# Patient Record
Sex: MALE | Race: ASIAN | NOT HISPANIC OR LATINO
[De-identification: names, ages, dates, MRNs, and addresses within clinical notes are randomized per-mention and may not be internally consistent; named-entity substitution may affect disease eponyms.]

---

## 2023-07-12 ENCOUNTER — APPOINTMENT (OUTPATIENT)
Dept: ORTHOPEDIC SURGERY | Facility: CLINIC | Age: 38
End: 2023-07-12

## 2023-09-11 ENCOUNTER — NON-APPOINTMENT (OUTPATIENT)
Age: 38
End: 2023-09-11

## 2023-09-12 ENCOUNTER — APPOINTMENT (OUTPATIENT)
Dept: ORTHOPEDIC SURGERY | Facility: CLINIC | Age: 38
End: 2023-09-12
Payer: COMMERCIAL

## 2023-09-12 VITALS — HEIGHT: 71 IN | WEIGHT: 170 LBS | BODY MASS INDEX: 23.8 KG/M2

## 2023-09-12 PROCEDURE — 99204 OFFICE O/P NEW MOD 45 MIN: CPT

## 2023-09-12 PROCEDURE — 73564 X-RAY EXAM KNEE 4 OR MORE: CPT | Mod: RT

## 2023-12-05 ENCOUNTER — OUTPATIENT (OUTPATIENT)
Dept: OUTPATIENT SERVICES | Facility: HOSPITAL | Age: 38
LOS: 1 days | Discharge: HOME | End: 2023-12-05

## 2023-12-05 VITALS
OXYGEN SATURATION: 100 % | TEMPERATURE: 98 F | WEIGHT: 179.68 LBS | SYSTOLIC BLOOD PRESSURE: 129 MMHG | DIASTOLIC BLOOD PRESSURE: 82 MMHG | HEIGHT: 70 IN | RESPIRATION RATE: 18 BRPM | HEART RATE: 64 BPM

## 2023-12-05 DIAGNOSIS — S89.91XA UNSPECIFIED INJURY OF RIGHT LOWER LEG, INITIAL ENCOUNTER: ICD-10-CM

## 2023-12-05 DIAGNOSIS — Z01.818 ENCOUNTER FOR OTHER PREPROCEDURAL EXAMINATION: ICD-10-CM

## 2023-12-05 LAB
ANION GAP SERPL CALC-SCNC: 5 MMOL/L — SIGNIFICANT CHANGE UP (ref 5–17)
ANION GAP SERPL CALC-SCNC: 5 MMOL/L — SIGNIFICANT CHANGE UP (ref 5–17)
BUN SERPL-MCNC: 12 MG/DL — SIGNIFICANT CHANGE UP (ref 7–23)
BUN SERPL-MCNC: 12 MG/DL — SIGNIFICANT CHANGE UP (ref 7–23)
CALCIUM SERPL-MCNC: 8.8 MG/DL — SIGNIFICANT CHANGE UP (ref 8.5–10.1)
CALCIUM SERPL-MCNC: 8.8 MG/DL — SIGNIFICANT CHANGE UP (ref 8.5–10.1)
CHLORIDE SERPL-SCNC: 103 MMOL/L — SIGNIFICANT CHANGE UP (ref 96–108)
CHLORIDE SERPL-SCNC: 103 MMOL/L — SIGNIFICANT CHANGE UP (ref 96–108)
CO2 SERPL-SCNC: 30 MMOL/L — SIGNIFICANT CHANGE UP (ref 22–31)
CO2 SERPL-SCNC: 30 MMOL/L — SIGNIFICANT CHANGE UP (ref 22–31)
CREAT SERPL-MCNC: 1.09 MG/DL — SIGNIFICANT CHANGE UP (ref 0.5–1.3)
CREAT SERPL-MCNC: 1.09 MG/DL — SIGNIFICANT CHANGE UP (ref 0.5–1.3)
EGFR: 90 ML/MIN/1.73M2 — SIGNIFICANT CHANGE UP
EGFR: 90 ML/MIN/1.73M2 — SIGNIFICANT CHANGE UP
GLUCOSE SERPL-MCNC: 98 MG/DL — SIGNIFICANT CHANGE UP (ref 70–99)
GLUCOSE SERPL-MCNC: 98 MG/DL — SIGNIFICANT CHANGE UP (ref 70–99)
HCT VFR BLD CALC: 44.2 % — SIGNIFICANT CHANGE UP (ref 39–50)
HCT VFR BLD CALC: 44.2 % — SIGNIFICANT CHANGE UP (ref 39–50)
HGB BLD-MCNC: 14.7 G/DL — SIGNIFICANT CHANGE UP (ref 13–17)
HGB BLD-MCNC: 14.7 G/DL — SIGNIFICANT CHANGE UP (ref 13–17)
MCHC RBC-ENTMCNC: 28.1 PG — SIGNIFICANT CHANGE UP (ref 27–34)
MCHC RBC-ENTMCNC: 28.1 PG — SIGNIFICANT CHANGE UP (ref 27–34)
MCHC RBC-ENTMCNC: 33.3 G/DL — SIGNIFICANT CHANGE UP (ref 32–36)
MCHC RBC-ENTMCNC: 33.3 G/DL — SIGNIFICANT CHANGE UP (ref 32–36)
MCV RBC AUTO: 84.5 FL — SIGNIFICANT CHANGE UP (ref 80–100)
MCV RBC AUTO: 84.5 FL — SIGNIFICANT CHANGE UP (ref 80–100)
NRBC # BLD: 0 /100 WBCS — SIGNIFICANT CHANGE UP (ref 0–0)
NRBC # BLD: 0 /100 WBCS — SIGNIFICANT CHANGE UP (ref 0–0)
PLATELET # BLD AUTO: 271 K/UL — SIGNIFICANT CHANGE UP (ref 150–400)
PLATELET # BLD AUTO: 271 K/UL — SIGNIFICANT CHANGE UP (ref 150–400)
POTASSIUM SERPL-MCNC: 3.8 MMOL/L — SIGNIFICANT CHANGE UP (ref 3.5–5.3)
POTASSIUM SERPL-MCNC: 3.8 MMOL/L — SIGNIFICANT CHANGE UP (ref 3.5–5.3)
POTASSIUM SERPL-SCNC: 3.8 MMOL/L — SIGNIFICANT CHANGE UP (ref 3.5–5.3)
POTASSIUM SERPL-SCNC: 3.8 MMOL/L — SIGNIFICANT CHANGE UP (ref 3.5–5.3)
RBC # BLD: 5.23 M/UL — SIGNIFICANT CHANGE UP (ref 4.2–5.8)
RBC # BLD: 5.23 M/UL — SIGNIFICANT CHANGE UP (ref 4.2–5.8)
RBC # FLD: 12.3 % — SIGNIFICANT CHANGE UP (ref 10.3–14.5)
RBC # FLD: 12.3 % — SIGNIFICANT CHANGE UP (ref 10.3–14.5)
SODIUM SERPL-SCNC: 138 MMOL/L — SIGNIFICANT CHANGE UP (ref 135–145)
SODIUM SERPL-SCNC: 138 MMOL/L — SIGNIFICANT CHANGE UP (ref 135–145)
WBC # BLD: 5.52 K/UL — SIGNIFICANT CHANGE UP (ref 3.8–10.5)
WBC # BLD: 5.52 K/UL — SIGNIFICANT CHANGE UP (ref 3.8–10.5)
WBC # FLD AUTO: 5.52 K/UL — SIGNIFICANT CHANGE UP (ref 3.8–10.5)
WBC # FLD AUTO: 5.52 K/UL — SIGNIFICANT CHANGE UP (ref 3.8–10.5)

## 2023-12-05 NOTE — H&P PST ADULT - HISTORY OF PRESENT ILLNESS
39 y/o M presents to PST for scheduled right knee arthroscopy ACL reconstruction possible medial and lateral meniscus repair on 12/18/23 with   37 y/o M presents to PST for scheduled right knee arthroscopy ACL reconstruction possible medial and lateral meniscus repair on 12/18/23 with   39 y/o M presents to PST after basketball injury and is scheduled for right knee arthroscopy ACL reconstruction possible medial and lateral meniscus repair on 12/18/23 with   37 y/o M presents to PST after basketball injury and is scheduled for right knee arthroscopy ACL reconstruction possible medial and lateral meniscus repair on 12/18/23 with

## 2023-12-05 NOTE — H&P PST ADULT - NSICDXPASTMEDICALHX_GEN_ALL_CORE_FT
Spoke with pt's wife, Bri.   Provided appointment info for 4/21 at 930 am   
PAST MEDICAL HISTORY:  No pertinent past medical history

## 2023-12-05 NOTE — H&P PST ADULT - PROBLEM SELECTOR PLAN 1
Wound care
Labs-CBC, BMP  Preop Hibiclens x 1 day instructions reviewed and given.  Take routine meds DOS with small sips of water, avoid NSAIDs and OTC supplements   Anesthesiologist to review PST labs, EKG, required clearances, and optimization for surgery

## 2023-12-05 NOTE — H&P PST ADULT - ASSESSMENT
39 y/o M presents to PST for scheduled right knee arthroscopy ACL reconstruction possible medial and lateral meniscus repair on 12/18/23 with   39 y/o M presents to PST for scheduled right knee arthroscopy ACL reconstruction possible medial and lateral meniscus repair on 23 with Dr.Cohn CAPRINI SCORE [CLOT]    AGE RELATED RISK FACTORS                                                       MOBILITY RELATED FACTORS  [ ] Age 41-60 years                                            (1 Point)                  [ ] Bed rest                                                        (1 Point)  [ ] Age: 61-74 years                                           (2 Points)                 [ ] Plaster cast                                                   (2 Points)  [ ] Age= 75 years                                              (3 Points)                 [ ] Bed bound for more than 72 hours                 (2 Points)    DISEASE RELATED RISK FACTORS                                               GENDER SPECIFIC FACTORS  [ ] Edema in the lower extremities                       (1 Point)                  [ ] Pregnancy                                                     (1 Point)  [ ] Varicose veins                                               (1 Point)                  [ ] Post-partum < 6 weeks                                   (1 Point)             [ ] BMI > 25 Kg/m2                                            (1 Point)                  [ ] Hormonal therapy  or oral contraception          (1 Point)                 [ ] Sepsis (in the previous month)                        (1 Point)                  [ ] History of pregnancy complications                 (1 point)  [ ] Pneumonia or serious lung disease                                               [ ] Unexplained or recurrent                     (1 Point)           (in the previous month)                               (1 Point)  [ ] Abnormal pulmonary function test                     (1 Point)                 SURGERY RELATED RISK FACTORS  [ ] Acute myocardial infarction                              (1 Point)                 [ ]  Section                                             (1 Point)  [ ] Congestive heart failure (in the previous month)  (1 Point)               [ ] Minor surgery                                                  (1 Point)   [ ] Inflammatory bowel disease                             (1 Point)                 [ x] Arthroscopic surgery                                        (2 Points)  [ ] Central venous access                                      (2 Points)                [ ] General surgery lasting more than 45 minutes   (2 Points)       [ ] Stroke (in the previous month)                          (5 Points)               [ ] Elective arthroplasty                                         (5 Points)                                                                                                                                               HEMATOLOGY RELATED FACTORS                                                 TRAUMA RELATED RISK FACTORS  [ ] Prior episodes of VTE                                     (3 Points)                [ ] Fracture of the hip, pelvis, or leg                       (5 Points)  [ ] Positive family history for VTE                         (3 Points)                 [ ] Acute spinal cord injury (in the previous month)  (5 Points)  [ ] Prothrombin 63598 A                                     (3 Points)                 [ ] Paralysis  (less than 1 month)                             (5 Points)  [ ] Factor V Leiden                                             (3 Points)                  [ ] Multiple Trauma within 1 month                        (5 Points)  [ ] Lupus anticoagulants                                     (3 Points)                                                           [ ] Anticardiolipin antibodies                               (3 Points)                                                       [ ] High homocysteine in the blood                      (3 Points)                                             [ ] Other congenital or acquired thrombophilia      (3 Points)                                                [ ] Heparin induced thrombocytopenia                  (3 Points)                                          Total Score [      2    ]    Ozi Score 0 - 2:  Low Risk, No VTE Prophylaxis required for most patients, encourage ambulation  Caprini Score 3 - 6:  At Risk, pharmacologic VTE prophylaxis is indicated for most patients (in the absence of a contraindication)  Caprini Score Greater than or = 7:  High Risk, pharmacologic VTE prophylaxis is indicated for most patients (in the absence of a contraindication) 39 y/o M presents to PST for scheduled right knee arthroscopy ACL reconstruction possible medial and lateral meniscus repair on 23 with Dr.Cohn CAPRINI SCORE [CLOT]    AGE RELATED RISK FACTORS                                                       MOBILITY RELATED FACTORS  [ ] Age 41-60 years                                            (1 Point)                  [ ] Bed rest                                                        (1 Point)  [ ] Age: 61-74 years                                           (2 Points)                 [ ] Plaster cast                                                   (2 Points)  [ ] Age= 75 years                                              (3 Points)                 [ ] Bed bound for more than 72 hours                 (2 Points)    DISEASE RELATED RISK FACTORS                                               GENDER SPECIFIC FACTORS  [ ] Edema in the lower extremities                       (1 Point)                  [ ] Pregnancy                                                     (1 Point)  [ ] Varicose veins                                               (1 Point)                  [ ] Post-partum < 6 weeks                                   (1 Point)             [ ] BMI > 25 Kg/m2                                            (1 Point)                  [ ] Hormonal therapy  or oral contraception          (1 Point)                 [ ] Sepsis (in the previous month)                        (1 Point)                  [ ] History of pregnancy complications                 (1 point)  [ ] Pneumonia or serious lung disease                                               [ ] Unexplained or recurrent                     (1 Point)           (in the previous month)                               (1 Point)  [ ] Abnormal pulmonary function test                     (1 Point)                 SURGERY RELATED RISK FACTORS  [ ] Acute myocardial infarction                              (1 Point)                 [ ]  Section                                             (1 Point)  [ ] Congestive heart failure (in the previous month)  (1 Point)               [ ] Minor surgery                                                  (1 Point)   [ ] Inflammatory bowel disease                             (1 Point)                 [ x] Arthroscopic surgery                                        (2 Points)  [ ] Central venous access                                      (2 Points)                [ ] General surgery lasting more than 45 minutes   (2 Points)       [ ] Stroke (in the previous month)                          (5 Points)               [ ] Elective arthroplasty                                         (5 Points)                                                                                                                                               HEMATOLOGY RELATED FACTORS                                                 TRAUMA RELATED RISK FACTORS  [ ] Prior episodes of VTE                                     (3 Points)                [ ] Fracture of the hip, pelvis, or leg                       (5 Points)  [ ] Positive family history for VTE                         (3 Points)                 [ ] Acute spinal cord injury (in the previous month)  (5 Points)  [ ] Prothrombin 74951 A                                     (3 Points)                 [ ] Paralysis  (less than 1 month)                             (5 Points)  [ ] Factor V Leiden                                             (3 Points)                  [ ] Multiple Trauma within 1 month                        (5 Points)  [ ] Lupus anticoagulants                                     (3 Points)                                                           [ ] Anticardiolipin antibodies                               (3 Points)                                                       [ ] High homocysteine in the blood                      (3 Points)                                             [ ] Other congenital or acquired thrombophilia      (3 Points)                                                [ ] Heparin induced thrombocytopenia                  (3 Points)                                          Total Score [      2    ]    Ozi Score 0 - 2:  Low Risk, No VTE Prophylaxis required for most patients, encourage ambulation  Caprini Score 3 - 6:  At Risk, pharmacologic VTE prophylaxis is indicated for most patients (in the absence of a contraindication)  Caprini Score Greater than or = 7:  High Risk, pharmacologic VTE prophylaxis is indicated for most patients (in the absence of a contraindication)

## 2023-12-05 NOTE — H&P PST ADULT - NSANTHOSAYNRD_GEN_A_CORE
No. WAGNER screening performed.  STOP BANG Legend: 0-2 = LOW Risk; 3-4 = INTERMEDIATE Risk; 5-8 = HIGH Risk

## 2023-12-17 ENCOUNTER — TRANSCRIPTION ENCOUNTER (OUTPATIENT)
Age: 38
End: 2023-12-17

## 2023-12-18 ENCOUNTER — APPOINTMENT (OUTPATIENT)
Dept: ORTHOPEDIC SURGERY | Facility: HOSPITAL | Age: 38
End: 2023-12-18

## 2023-12-18 ENCOUNTER — OUTPATIENT (OUTPATIENT)
Dept: OUTPATIENT SERVICES | Facility: HOSPITAL | Age: 38
LOS: 1 days | Discharge: HOME | End: 2023-12-18
Payer: COMMERCIAL

## 2023-12-18 ENCOUNTER — TRANSCRIPTION ENCOUNTER (OUTPATIENT)
Age: 38
End: 2023-12-18

## 2023-12-18 VITALS
OXYGEN SATURATION: 98 % | SYSTOLIC BLOOD PRESSURE: 126 MMHG | TEMPERATURE: 98 F | HEIGHT: 70 IN | RESPIRATION RATE: 14 BRPM | HEART RATE: 70 BPM | WEIGHT: 173.94 LBS | DIASTOLIC BLOOD PRESSURE: 74 MMHG

## 2023-12-18 VITALS
TEMPERATURE: 98 F | OXYGEN SATURATION: 98 % | DIASTOLIC BLOOD PRESSURE: 76 MMHG | RESPIRATION RATE: 16 BRPM | SYSTOLIC BLOOD PRESSURE: 131 MMHG | HEART RATE: 82 BPM

## 2023-12-18 PROCEDURE — 29888 ARTHRS AID ACL RPR/AGMNTJ: CPT | Mod: RT

## 2023-12-18 PROCEDURE — 29882 ARTHRS KNE SRG MNISC RPR M/L: CPT | Mod: RT

## 2023-12-18 DEVICE — IMP TIGHTROPE II ABS OPEN: Type: IMPLANTABLE DEVICE | Site: RIGHT | Status: FUNCTIONAL

## 2023-12-18 DEVICE — S&N FASTFIX 360 CURVED: Type: IMPLANTABLE DEVICE | Site: RIGHT | Status: FUNCTIONAL

## 2023-12-18 DEVICE — IMPLANTABLE DEVICE: Type: IMPLANTABLE DEVICE | Site: RIGHT | Status: FUNCTIONAL

## 2023-12-18 DEVICE — IMP TIGHTROPE ABS BUTTON 8X12MM: Type: IMPLANTABLE DEVICE | Site: RIGHT | Status: FUNCTIONAL

## 2023-12-18 DEVICE — IMP TIGHTROPE II BTB W/ DEPLOYING SUTURE: Type: IMPLANTABLE DEVICE | Site: RIGHT | Status: FUNCTIONAL

## 2023-12-18 RX ORDER — OXYCODONE HYDROCHLORIDE 5 MG/1
1 TABLET ORAL
Qty: 30 | Refills: 0
Start: 2023-12-18 | End: 2023-12-22

## 2023-12-18 RX ORDER — SODIUM CHLORIDE 9 MG/ML
1000 INJECTION, SOLUTION INTRAVENOUS
Refills: 0 | Status: DISCONTINUED | OUTPATIENT
Start: 2023-12-18 | End: 2023-12-18

## 2023-12-18 RX ORDER — ONDANSETRON 8 MG/1
1 TABLET, FILM COATED ORAL
Qty: 30 | Refills: 0
Start: 2023-12-18 | End: 2023-12-22

## 2023-12-18 RX ORDER — HYDROMORPHONE HYDROCHLORIDE 2 MG/ML
0.5 INJECTION INTRAMUSCULAR; INTRAVENOUS; SUBCUTANEOUS
Refills: 0 | Status: DISCONTINUED | OUTPATIENT
Start: 2023-12-18 | End: 2023-12-18

## 2023-12-18 RX ORDER — DOCUSATE SODIUM 100 MG
1 CAPSULE ORAL
Qty: 7 | Refills: 0
Start: 2023-12-18 | End: 2023-12-24

## 2023-12-18 RX ORDER — ASPIRIN/CALCIUM CARB/MAGNESIUM 324 MG
1 TABLET ORAL
Qty: 30 | Refills: 0
Start: 2023-12-18 | End: 2024-01-16

## 2023-12-18 RX ORDER — SODIUM CHLORIDE 9 MG/ML
3 INJECTION INTRAMUSCULAR; INTRAVENOUS; SUBCUTANEOUS EVERY 8 HOURS
Refills: 0 | Status: DISCONTINUED | OUTPATIENT
Start: 2023-12-18 | End: 2023-12-18

## 2023-12-18 RX ORDER — HYDROMORPHONE HYDROCHLORIDE 2 MG/ML
1 INJECTION INTRAMUSCULAR; INTRAVENOUS; SUBCUTANEOUS
Refills: 0 | Status: DISCONTINUED | OUTPATIENT
Start: 2023-12-18 | End: 2023-12-18

## 2023-12-18 RX ADMIN — HYDROMORPHONE HYDROCHLORIDE 0.5 MILLIGRAM(S): 2 INJECTION INTRAMUSCULAR; INTRAVENOUS; SUBCUTANEOUS at 10:10

## 2023-12-18 RX ADMIN — SODIUM CHLORIDE 75 MILLILITER(S): 9 INJECTION, SOLUTION INTRAVENOUS at 10:05

## 2023-12-18 NOTE — ASU DISCHARGE PLAN (ADULT/PEDIATRIC) - ASU DC SPECIAL INSTRUCTIONSFT
Post-op Instructions ACL Surgery with meniscal repair    POST-OP MEDICATIONS:     PAIN: You were given a prescription for narcotic pain medication. Take this medication as needed and as directed for breakthrough pain. You should try Extra Strength Tylenol first (500mg every 4 hours; not to exceed 3,000mg in 24 hours)/anti-inflammatories (such as Motrin) regularly to help control pain.      BLOOD CLOT PREVENTION: Anti-coagulation is critical to minimize the risk of a DVT (or blood clot).  We recommend you take Aspirin 325mg once daily for 4 weeks as a precaution unless instructed otherwise.     ICE:  An ice device or ice bag (not directly touching the skin) should be utilized to reduce swelling and pain. Please ice every 3-4 hours for about 15-20 minutes each time until swelling subsides. An ice device is typically not covered by insurance but we do have them available to purchase in our office     BRACE: You have been given a knee brace. This should remain on locked in FULL KNEE/LEG EXTENSION until you are otherwise directed by the doctor at your post-op visit. It may be removed for dressing, and bathing. This brace is essential to protect your new ACL while your leg is weak.     AMBULATION: Since you had your meniscus repaired as well, NO WEIGHT BEARING AT ALL ON YOUR RIGHT (OPERATIVE) LEG. Use crutches for assistance.    WOUND CARE:  Leave your surgical dressing on for the first 3 days. After 3 days, you may remove your dressing and shower. Do not remove steri-strips. Incisions may get wet but do not soak them and dry it off well. We recommend putting a sterile dry dressing/gauze or bandaid back over the incisions.     FOLLOW UP VISIT: If you do not already have a follow-up visit scheduled, then please call the office to schedule one within 14 days.      Please refer to Dr. Sparrow's instruction sheet regarding medications to take after surgery

## 2023-12-18 NOTE — ASU DISCHARGE PLAN (ADULT/PEDIATRIC) - CARE PROVIDER_API CALL
Ferdinand Sparrow  Orthopaedic Surgery  [address], [address]  [place] [address]  Phone: [phone]  Fax: [phone]  Follow Up Time:    Ferdinand Sparrow  Orthopaedic Surgery  [address], [address]  [place] [address]  Phone: [phone]  Fax: [phone]  Follow Up Time:

## 2023-12-18 NOTE — ASU DISCHARGE PLAN (ADULT/PEDIATRIC) - NS MD DC FALL RISK RISK
For information on Fall & Injury Prevention, visit: [email or web address]/news/fall-[email or web address]-health-[email or web address]-mobility OR  https://www.[facility]/news/fall-prevention-tips-[email or web address]-injury OR  https://www.cdc.gov/steadi/patient.html For information on Fall & Injury Prevention, visit: https://www.[facility]/news/[email or web address]-[email or web address]-[email or web address]-mobility OR  [email or web address]/[email or web address]-[email or web address]-[email or web address]-injury OR  https://www.cdc.gov/steadi/patient.html

## 2023-12-18 NOTE — ASU PATIENT PROFILE, ADULT - FALL HARM RISK - HARM RISK INTERVENTIONS
Communicate Risk of Fall with Harm to all staff/Reinforce activity limits and safety measures with patient and family/Tailored Fall Risk Interventions/Visual Cue: Yellow wristband and red socks/Bed in lowest position, wheels locked, appropriate side rails in place/Call bell, personal items and telephone in reach/Instruct patient to call for assistance before getting out of bed or chair/Non-slip footwear when patient is out of bed/[place] to call system/Physically safe environment - no spills, clutter or unnecessary equipment/Purposeful Proactive Rounding/Room/bathroom lighting operational, light cord in reach Communicate Risk of Fall with Harm to all staff/Reinforce activity limits and safety measures with patient and family/Tailored Fall Risk Interventions/Visual Cue: Yellow wristband and red socks/Bed in lowest position, wheels locked, appropriate side rails in place/Call bell, personal items and telephone in reach/Instruct patient to call for assistance before getting out of bed or chair/Non-slip footwear when patient is out of bed/[place] to call system/Physically safe environment - no spills, clutter or unnecessary equipment/Purposeful Proactive Rounding/Room/bathroom lighting operational, light cord in reach

## 2023-12-18 NOTE — BRIEF OPERATIVE NOTE - NSICDXBRIEFPROCEDURE_GEN_ALL_CORE_FT
PROCEDURES:  ACL reconstruction 18-Dec-2023 10:00:50 Right knee, with lateral meniscus repair [place]

## 2023-12-22 DIAGNOSIS — S83.511A SPRAIN OF ANTERIOR CRUCIATE LIGAMENT OF RIGHT KNEE, INITIAL ENCOUNTER: ICD-10-CM
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
## 2023-12-22 DIAGNOSIS — S83.281A OTHER TEAR OF LATERAL MENISCUS, CURRENT INJURY, RIGHT KNEE, INITIAL ENCOUNTER: ICD-10-CM
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
## 2023-12-29 ENCOUNTER — APPOINTMENT (OUTPATIENT)
Dept: ORTHOPEDIC SURGERY | Facility: CLINIC | Age: 38
End: 2023-12-29
Payer: COMMERCIAL

## 2023-12-29 PROBLEM — Z78.9 OTHER SPECIFIED HEALTH STATUS: Chronic | Status: ACTIVE | Noted: 2023-12-05

## 2023-12-29 PROCEDURE — 99024 POSTOP FOLLOW-UP VISIT: CPT

## 2023-12-29 NOTE — HISTORY OF PRESENT ILLNESS
[de-identified] : 39yo M s/p Right knee arthroscopy, anterior cruciate ligament reconstruction with quadriceps allograft, lateral meniscus repair, 12/18/23.  Overall doing well pain is controlled he started physical therapy he is using the brace and crutches [de-identified] : R knee [de-identified] : Right knee exam Incisions are healing well no erythema Moderate effusion Range of motion 0-60 Stable anterior drawer Lachman examination no Medial joint line tenderness calf is soft and nontender Able to flex and extend all toes Sensation intact throughout brisk capillary refill. [de-identified] : 37yo M s/p Right knee arthroscopy, anterior cruciate ligament reconstruction with quadriceps allograft, lateral meniscus repair, 12/18/23. [de-identified] : We reviewed postoperative protocol restrictions.  Brace unlocked 0 to 90 degrees encouraged continue physical therapy continue crutches nonweightbearing.  He will follow-up in 1 month.  All questions were answered continue aspirin DVT prophylaxis

## 2024-01-26 ENCOUNTER — APPOINTMENT (OUTPATIENT)
Dept: ORTHOPEDIC SURGERY | Facility: CLINIC | Age: 39
End: 2024-01-26
Payer: COMMERCIAL

## 2024-01-26 PROCEDURE — 99024 POSTOP FOLLOW-UP VISIT: CPT

## 2024-01-26 NOTE — HISTORY OF PRESENT ILLNESS
[de-identified] : R knee [de-identified] : 37yo M s/p Right knee arthroscopy, anterior cruciate ligament reconstruction with quadriceps allograft, lateral meniscus repair, 12/18/23.  Overall doing well pain is controlled he started physical therapy he is using the brace and crutches [de-identified] : Right knee exam Incisions are healing well no erythema no effusion Range of motion 0-120 Stable anterior drawer Lachman examination no Medial joint line tenderness calf is soft and nontender Able to flex and extend all toes Sensation intact throughout brisk capillary refill. [de-identified] : 37yo M s/p Right knee arthroscopy, anterior cruciate ligament reconstruction with quadriceps allograft, lateral meniscus repair, 12/18/23. [de-identified] : We reviewed postoperative protocol restrictions.  He may discontinue his brace at this time continue physical therapy weightbearing as tolerated follow-up 6 weeks

## 2024-01-26 NOTE — HISTORY OF PRESENT ILLNESS
[de-identified] : R knee [de-identified] : 37yo M s/p Right knee arthroscopy, anterior cruciate ligament reconstruction with quadriceps allograft, lateral meniscus repair, 12/18/23.  Overall doing well pain is controlled he started physical therapy he is using the brace and crutches [de-identified] : Right knee exam Incisions are healing well no erythema no effusion Range of motion 0-120 Stable anterior drawer Lachman examination no Medial joint line tenderness calf is soft and nontender Able to flex and extend all toes Sensation intact throughout brisk capillary refill. [de-identified] : 39yo M s/p Right knee arthroscopy, anterior cruciate ligament reconstruction with quadriceps allograft, lateral meniscus repair, 12/18/23. [de-identified] : We reviewed postoperative protocol restrictions.  He may discontinue his brace at this time continue physical therapy weightbearing as tolerated follow-up 6 weeks

## 2024-01-30 ENCOUNTER — NON-APPOINTMENT (OUTPATIENT)
Age: 39
End: 2024-01-30

## 2024-02-15 ENCOUNTER — APPOINTMENT (OUTPATIENT)
Dept: ORTHOPEDIC SURGERY | Facility: CLINIC | Age: 39
End: 2024-02-15
Payer: COMMERCIAL

## 2024-02-15 PROCEDURE — 99024 POSTOP FOLLOW-UP VISIT: CPT

## 2024-03-08 ENCOUNTER — APPOINTMENT (OUTPATIENT)
Dept: ORTHOPEDIC SURGERY | Facility: CLINIC | Age: 39
End: 2024-03-08
Payer: COMMERCIAL

## 2024-03-08 VITALS — BODY MASS INDEX: 24.5 KG/M2 | HEIGHT: 71 IN | WEIGHT: 175 LBS

## 2024-03-08 PROCEDURE — 99024 POSTOP FOLLOW-UP VISIT: CPT

## 2024-03-08 NOTE — HISTORY OF PRESENT ILLNESS
[de-identified] : 39yo M s/p Right knee arthroscopy, anterior cruciate ligament reconstruction with quadriceps allograft, lateral meniscus repair, 12/18/23.  Overall doing well.  She is work with physical therapy reports mild swelling in his knee he is happy with his progress he denies any buckling locking catching instability [de-identified] : R knee [de-identified] : 37yo M s/p Right knee arthroscopy, anterior cruciate ligament reconstruction with quadriceps allograft, lateral meniscus repair, 12/18/23. [de-identified] : Right knee exam Incisions are healing well no erythema Mild effusion Range of motion 0-140 no Medial joint line tenderness.  No lateral joint line tenderness stable anterior drawer Lachman examination calf is soft and nontender Able to flex and extend all toes Sensation intact throughout brisk capillary refill. [de-identified] : We reviewed postoperative protocol and restrictions.  He has excellent range of motion he does have some mild swelling his knee is stable on examination today.  He will continue with physical therapy focusing quadriceps core strengthening he will follow-up in 2 to 3 months.  All questions answered

## 2024-05-08 ENCOUNTER — APPOINTMENT (OUTPATIENT)
Dept: ORTHOPEDIC SURGERY | Facility: CLINIC | Age: 39
End: 2024-05-08
Payer: COMMERCIAL

## 2024-05-08 DIAGNOSIS — S89.91XA UNSPECIFIED INJURY OF RIGHT LOWER LEG, INITIAL ENCOUNTER: ICD-10-CM

## 2024-05-08 PROCEDURE — 99213 OFFICE O/P EST LOW 20 MIN: CPT

## 2024-05-08 NOTE — DISCUSSION/SUMMARY
[de-identified] : He is about 5-month status post anterior crucial ligament reconstruction doing well he has full range of motion no swelling good stability on examination.  Does have significant and continued quadriceps core weakness he continues therapy and exercise  Follow-up in 3 months for sports evaluation.  All questions were answered

## 2024-05-08 NOTE — PHYSICAL EXAM
[de-identified] : right knee exam  Skin: Clean, dry, intact Inspection: No obvious malalignment, no masses, no swelling, no effusion. Tenderness: no MJLT. No LJLT. No tenderness over the medial and lateral patella facets. No ttp medial/lateral epicondyle, patella tendon, tibial tubercle, pes anserinus, or proximal fibula. ROM: 0 to 140 no pain with deep flexion  Stability: Stable to varus, valgus, lachman testing. Negative anterior/posterior drawer. Additional tests: Negative McMurrays test, Negative patellar grind test.  Strength: 5/5 Q/H/TA/GS/EHL, no atrophy Neuro: In tact to light touch throughout in dp/sp/tib/bharath/saph nerve districutions, DTR's normal Pulses: 2+ DP/PT pulses.

## 2024-05-08 NOTE — PHYSICAL EXAM
[de-identified] : right knee exam  Skin: Clean, dry, intact Inspection: No obvious malalignment, no masses, no swelling, no effusion. Tenderness: no MJLT. No LJLT. No tenderness over the medial and lateral patella facets. No ttp medial/lateral epicondyle, patella tendon, tibial tubercle, pes anserinus, or proximal fibula. ROM: 0 to 140 no pain with deep flexion  Stability: Stable to varus, valgus, lachman testing. Negative anterior/posterior drawer. Additional tests: Negative McMurrays test, Negative patellar grind test.  Strength: 5/5 Q/H/TA/GS/EHL, no atrophy Neuro: In tact to light touch throughout in dp/sp/tib/bharath/saph nerve districutions, DTR's normal Pulses: 2+ DP/PT pulses.

## 2024-05-08 NOTE — HISTORY OF PRESENT ILLNESS
[de-identified] : 40yo M s/p Right knee arthroscopy, anterior cruciate ligament reconstruction with quadriceps allograft, lateral meniscus repair, 12/18/23.

## 2024-05-08 NOTE — HISTORY OF PRESENT ILLNESS
[de-identified] : 40yo M s/p Right knee arthroscopy, anterior cruciate ligament reconstruction with quadriceps allograft, lateral meniscus repair, 12/18/23.

## 2024-05-08 NOTE — HISTORY OF PRESENT ILLNESS
[de-identified] : 38yo M s/p Right knee arthroscopy, anterior cruciate ligament reconstruction with quadriceps allograft, lateral meniscus repair, 12/18/23.

## 2024-05-08 NOTE — DISCUSSION/SUMMARY
[de-identified] : He is about 5-month status post anterior crucial ligament reconstruction doing well he has full range of motion no swelling good stability on examination.  Does have significant and continued quadriceps core weakness he continues therapy and exercise  Follow-up in 3 months for sports evaluation.  All questions were answered

## 2024-05-08 NOTE — PHYSICAL EXAM
[de-identified] : right knee exam  Skin: Clean, dry, intact Inspection: No obvious malalignment, no masses, no swelling, no effusion. Tenderness: no MJLT. No LJLT. No tenderness over the medial and lateral patella facets. No ttp medial/lateral epicondyle, patella tendon, tibial tubercle, pes anserinus, or proximal fibula. ROM: 0 to 140 no pain with deep flexion  Stability: Stable to varus, valgus, lachman testing. Negative anterior/posterior drawer. Additional tests: Negative McMurrays test, Negative patellar grind test.  Strength: 5/5 Q/H/TA/GS/EHL, no atrophy Neuro: In tact to light touch throughout in dp/sp/tib/bharath/saph nerve districutions, DTR's normal Pulses: 2+ DP/PT pulses.

## 2024-05-08 NOTE — DISCUSSION/SUMMARY
[de-identified] : He is about 5-month status post anterior crucial ligament reconstruction doing well he has full range of motion no swelling good stability on examination.  Does have significant and continued quadriceps core weakness he continues therapy and exercise  Follow-up in 3 months for sports evaluation.  All questions were answered

## 2024-07-08 ENCOUNTER — APPOINTMENT (OUTPATIENT)
Dept: UROLOGY | Facility: CLINIC | Age: 39
End: 2024-07-08
Payer: COMMERCIAL

## 2024-07-08 VITALS
RESPIRATION RATE: 16 BRPM | WEIGHT: 170 LBS | HEIGHT: 71 IN | SYSTOLIC BLOOD PRESSURE: 135 MMHG | BODY MASS INDEX: 23.8 KG/M2 | DIASTOLIC BLOOD PRESSURE: 81 MMHG | OXYGEN SATURATION: 98 % | HEART RATE: 66 BPM

## 2024-07-08 DIAGNOSIS — S89.91XA UNSPECIFIED INJURY OF RIGHT LOWER LEG, INITIAL ENCOUNTER: ICD-10-CM

## 2024-07-08 DIAGNOSIS — Z80.3 FAMILY HISTORY OF MALIGNANT NEOPLASM OF BREAST: ICD-10-CM

## 2024-07-08 DIAGNOSIS — R35.0 FREQUENCY OF MICTURITION: ICD-10-CM

## 2024-07-08 PROCEDURE — 51798 US URINE CAPACITY MEASURE: CPT

## 2024-07-08 PROCEDURE — G2211 COMPLEX E/M VISIT ADD ON: CPT | Mod: NC

## 2024-07-08 PROCEDURE — 99204 OFFICE O/P NEW MOD 45 MIN: CPT | Mod: 25

## 2024-07-10 LAB
ANION GAP SERPL CALC-SCNC: 13 MMOL/L
APPEARANCE: CLEAR
BACTERIA: NEGATIVE /HPF
BILIRUBIN URINE: NEGATIVE
BLOOD URINE: NEGATIVE
BUN SERPL-MCNC: 12 MG/DL
CALCIUM SERPL-MCNC: 9.7 MG/DL
CAST: 0 /LPF
CHLORIDE SERPL-SCNC: 102 MMOL/L
CO2 SERPL-SCNC: 25 MMOL/L
COLOR: YELLOW
CREAT SERPL-MCNC: 1.18 MG/DL
EGFR: 81 ML/MIN/1.73M2
EPITHELIAL CELLS: 0 /HPF
GLUCOSE QUALITATIVE U: NEGATIVE MG/DL
GLUCOSE SERPL-MCNC: 97 MG/DL
KETONES URINE: NEGATIVE MG/DL
LEUKOCYTE ESTERASE URINE: NEGATIVE
NITRITE URINE: NEGATIVE
PH URINE: 6
POTASSIUM SERPL-SCNC: 4.4 MMOL/L
PROTEIN URINE: NEGATIVE MG/DL
PSA FREE FLD-MCNC: 47 %
PSA FREE SERPL-MCNC: 0.17 NG/ML
PSA SERPL-MCNC: 0.37 NG/ML
SPECIFIC GRAVITY URINE: 1.01
UROBILINOGEN URINE: 0.2 MG/DL
WHITE BLOOD CELLS URINE: 0 /HPF

## 2024-08-09 ENCOUNTER — APPOINTMENT (OUTPATIENT)
Dept: ORTHOPEDIC SURGERY | Facility: CLINIC | Age: 39
End: 2024-08-09

## 2024-08-09 PROCEDURE — 99213 OFFICE O/P EST LOW 20 MIN: CPT

## 2024-08-09 NOTE — PHYSICAL EXAM
[de-identified] : right knee exam  Skin: Clean, dry, intact Inspection: No obvious malalignment, no masses, no swelling, no effusion. Tenderness: no MJLT. No LJLT. No tenderness over the medial and lateral patella facets. No ttp medial/lateral epicondyle, patella tendon, tibial tubercle, pes anserinus, or proximal fibula. ROM: 0 to 140 no pain with deep flexion  Stability: Stable to varus, valgus, lachman testing. Negative anterior/posterior drawer. Additional tests: Negative McMurrays test, Negative patellar grind test.  Strength: 5/5 Q/H/TA/GS/EHL, no atrophy Neuro: In tact to light touch throughout in dp/sp/tib/bharath/saph nerve districutions, DTR's normal Pulses: 2+ DP/PT pulses. Unable to single-leg squat with his pelvis level

## 2024-08-09 NOTE — HISTORY OF PRESENT ILLNESS
[de-identified] : 38yo M s/p Right knee arthroscopy, anterior cruciate ligament reconstruction with quadriceps allograft, lateral meniscus repair, 12/18/23.  He is overall doing well minimal pain he is working physical therapy does feel some weakness in his leg.  He started on the treadmill.  His goal is to return to sprinting and sports  He is doing really well at this time he is working with therapy doing some exercises looking forward to returning to basketball

## 2024-08-09 NOTE — DISCUSSION/SUMMARY
[de-identified] : He is almost 9 months postoperative he is doing very well at this time is a full range of motion excellent stability good strength he can single-leg hop and squat he is cleared to gradually return to sports and activities with slow gradual progression outlined he understands and expressed understanding he will follow-up as needed.  All questions answered

## 2024-12-18 ENCOUNTER — NON-APPOINTMENT (OUTPATIENT)
Age: 39
End: 2024-12-18

## (undated) DEVICE — GLV 8 PROTEXIS (WHITE)

## (undated) DEVICE — VENODYNE/SCD SLEEVE CALF MEDIUM

## (undated) DEVICE — SUT VICRYL 2-0 27" CT-2 UNDYED

## (undated) DEVICE — GLV 7.5 PROTEXIS (WHITE)

## (undated) DEVICE — SUT VICRYL 0 27" CT-2 UNDYED

## (undated) DEVICE — DEPUY ACL GRAFT KNIFE 10MM

## (undated) DEVICE — SHAVER BLADE GREAT WHITE 4.2MM

## (undated) DEVICE — SUT HISTOACRYL BLUE

## (undated) DEVICE — SUT MONOCRYL 3-0 18" PS-2 UNDYED

## (undated) DEVICE — SUT TIGERSTICK TIGERWIRE NUMBER 2

## (undated) DEVICE — SUT FIBERSTICK SIZE 2 50" BLUE

## (undated) DEVICE — SUT FIBERWIRE #2 38" STRAND 1 BLUE T-5 TAPER

## (undated) DEVICE — ELCTR BOVIE PENCIL HANDPIECE

## (undated) DEVICE — DRSG COBAN 4"

## (undated) DEVICE — BLADE SURGICAL #15 CARBON

## (undated) DEVICE — PACK MINOR WITH LAP

## (undated) DEVICE — ARTHREX SCORPION NEEDLE KNEE

## (undated) DEVICE — ARTHREX KIT ACL TRANSTIBIAL W SAW BLADE

## (undated) DEVICE — DRSG STERISTRIPS 0.5 X 4"

## (undated) DEVICE — SOL IRR POUR NS 0.9% 1000ML

## (undated) DEVICE — SAW BLADE LINVATEC SAGITTAL MIC 9.5X25.5X0.4MM

## (undated) DEVICE — NDL SPINAL 18G X 3.5" (PINK)

## (undated) DEVICE — LAP PAD 18 X 18"

## (undated) DEVICE — S&N ARTHROCARE WAND COBLATION WEREWOLF FLOW 90

## (undated) DEVICE — FRA-TOURNIQUET 402010060005: Type: DURABLE MEDICAL EQUIPMENT

## (undated) DEVICE — FRA-ESU BOVIE FORCE FX F3B25575A: Type: DURABLE MEDICAL EQUIPMENT

## (undated) DEVICE — WARMING BLANKET UPPER ADULT

## (undated) DEVICE — POSITIONER FOAM BUMP FLAT TOP 10X6X4" LRG

## (undated) DEVICE — TOURNIQUET ESMARK 6"

## (undated) DEVICE — DRAPE 3/4 SHEET W REINFORCEMENT 56X77"

## (undated) DEVICE — SOL IRR LR 3000ML

## (undated) DEVICE — PACK KNEE ARTHROSCOPY